# Patient Record
Sex: FEMALE | Race: BLACK OR AFRICAN AMERICAN | NOT HISPANIC OR LATINO | URBAN - METROPOLITAN AREA
[De-identification: names, ages, dates, MRNs, and addresses within clinical notes are randomized per-mention and may not be internally consistent; named-entity substitution may affect disease eponyms.]

---

## 2018-12-31 ENCOUNTER — EMERGENCY (EMERGENCY)
Facility: HOSPITAL | Age: 73
LOS: 1 days | Discharge: HOME | End: 2018-12-31
Attending: EMERGENCY MEDICINE | Admitting: EMERGENCY MEDICINE
Payer: MEDICARE

## 2018-12-31 VITALS
RESPIRATION RATE: 16 BRPM | SYSTOLIC BLOOD PRESSURE: 157 MMHG | OXYGEN SATURATION: 100 % | HEART RATE: 64 BPM | DIASTOLIC BLOOD PRESSURE: 93 MMHG | TEMPERATURE: 98 F

## 2018-12-31 VITALS
OXYGEN SATURATION: 100 % | TEMPERATURE: 98 F | SYSTOLIC BLOOD PRESSURE: 134 MMHG | RESPIRATION RATE: 18 BRPM | HEART RATE: 59 BPM | DIASTOLIC BLOOD PRESSURE: 70 MMHG

## 2018-12-31 PROCEDURE — 73502 X-RAY EXAM HIP UNI 2-3 VIEWS: CPT | Mod: 26,LT

## 2018-12-31 PROCEDURE — 99284 EMERGENCY DEPT VISIT MOD MDM: CPT | Mod: GC

## 2018-12-31 PROCEDURE — 73030 X-RAY EXAM OF SHOULDER: CPT | Mod: 26,LT

## 2018-12-31 PROCEDURE — 71045 X-RAY EXAM CHEST 1 VIEW: CPT | Mod: 26

## 2018-12-31 PROCEDURE — 73060 X-RAY EXAM OF HUMERUS: CPT | Mod: 26,LT

## 2018-12-31 PROCEDURE — 73552 X-RAY EXAM OF FEMUR 2/>: CPT | Mod: 26,LT

## 2018-12-31 RX ORDER — ACETAMINOPHEN 500 MG
975 TABLET ORAL ONCE
Qty: 0 | Refills: 0 | Status: COMPLETED | OUTPATIENT
Start: 2018-12-31 | End: 2018-12-31

## 2018-12-31 RX ORDER — LIDOCAINE 4 G/100G
1 CREAM TOPICAL ONCE
Qty: 0 | Refills: 0 | Status: COMPLETED | OUTPATIENT
Start: 2018-12-31 | End: 2018-12-31

## 2018-12-31 RX ADMIN — Medication 975 MILLIGRAM(S): at 12:56

## 2018-12-31 RX ADMIN — LIDOCAINE 1 PATCH: 4 CREAM TOPICAL at 15:24

## 2018-12-31 NOTE — ED PROVIDER NOTE - ATTENDING CONTRIBUTION TO CARE
Ena: 74 yo female s/p mechanical slip and fall yesterday on some water at home. Pt c/o left shoulder and left posterior thigh pain. Pt denies head trauma and LOC. No visual changes, dizziness, chest pain or SOB. No abdominal pain or hip pain. Pt has been ambulatory since the fall. No back or neck pain. Pt denies paresthesias and weakness. Exam: GENERAL: well appearing, NAD, HEENT: MMM, PERRLA, CARDIO: +S1/S2, no murmurs, rubs or gallops, LUNGS: CTA B/L, no wheezing, rales or rhonchi, ABD: soft, nontender, BSx4 quadrants, no guarding or rigidity. EXT: + anterior left shoulder TTP, appears squared off and pain elicited with abduction, no humeral TTP, NVI distally, no other bony TTP, no Rib TTP, + left posterior thigh soft tissue ecchymosis and TTP, no edema, no bony TTP, FROM and 5/5 strength.  No midline spinal TTP. NEURO: AxOx3,  SKIN: posterior thigh ecchymosis A/P- 74 yo female s/p mechanical fall with left shoulder and thigh pain. Thigh injury likely contusion but given age will obtain xrays, will also obtain shoulder/humerus xrays, give pain control and reassess. Ena: 74 yo female s/p mechanical slip and fall yesterday on some water at home. Pt c/o left shoulder and left posterior thigh pain. Pt denies head trauma and LOC. No visual changes, dizziness, chest pain or SOB. No abdominal pain or hip pain. Pt has been ambulatory since the fall. No back or neck pain. Pt denies paresthesias and weakness. Exam: GENERAL: well appearing, NAD, HEENT: MMM, PERRLA, CARDIO: +S1/S2, no murmurs, rubs or gallops, LUNGS: CTA B/L, no wheezing, rales or rhonchi, ABD: soft, nontender, BSx4 quadrants, no guarding or rigidity. EXT: + anterior left shoulder TTP, appears squared off and pain elicited with abduction, no humeral TTP, NVI distally, no other bony TTP, no Rib TTP, + left posterior thigh soft tissue ecchymosis and TTP, no edema, no bony TTP, FROM and 5/5 strength.  No midline spinal TTP. NO hip or pelvis TTP or instability. NEURO: AxOx3,  SKIN: posterior thigh ecchymosis A/P- 74 yo female s/p mechanical fall with left shoulder and thigh pain. Thigh injury likely contusion but given age will obtain xrays, will also obtain shoulder/humerus xrays, give pain control and reassess.

## 2018-12-31 NOTE — ED PROVIDER NOTE - CONSTITUTIONAL, MLM
normal... Well appearing, well nourished, awake, alert, oriented to person, place, time/situation and in no apparent distress. Holding left arm adducted

## 2018-12-31 NOTE — ED PROVIDER NOTE - NSFOLLOWUPINSTRUCTIONS_ED_ALL_ED_FT
Activities as tolerated. Please encourage good oral and fluid intake. For pain, please take or Tylenol 1000mg every 6 hours as needed. Please use lidopatch over the counter one patch every 24 hours as needed for your shoulder pain. Please use ice compresses to reduce swelling and inflammation.    Please see your primary care doctor within 24-48 hours for further management of your symptoms.  Please follow up with your orthopedic doctors for further evaluation of symptoms.    Please seek emergent medical management if you have any worsening signs or symptoms, such as chest pain, difficulty breathing, loss of consciousness, or persistent vomiting, new onset weakness, swelling, or new rash.

## 2018-12-31 NOTE — ED PROVIDER NOTE - PROGRESS NOTE DETAILS
Ena: Xrays unremarkable, discussed with radiology- agree with reads, pt is ambulatory will d.c home.

## 2018-12-31 NOTE — ED PROVIDER NOTE - MEDICAL DECISION MAKING DETAILS
73 year old female with unremarkable pmhx presenting with left shoulder pain s/p fall. Suggestive of dislocation vs fx vs musculoskeletal strain. Will get xrays to evaluate and give tylenol for pain control and reassess.

## 2018-12-31 NOTE — ED PROVIDER NOTE - CARE PLAN
Principal Discharge DX:	Contusion of left shoulder, initial encounter  Secondary Diagnosis:	Contusion of left thigh, initial encounter

## 2018-12-31 NOTE — ED ADULT TRIAGE NOTE - CHIEF COMPLAINT QUOTE
Pt had slip and fall in kitchen yesterday from wet floor. Pt c/o left shoulder and leg pain. Denies any head trauma or blood thinner use.

## 2018-12-31 NOTE — ED PROVIDER NOTE - MUSCULOSKELETAL, MLM
motor intact UE LE BL. Pain with ROM of L shoulder. Unable to abduct arm due to pain, but able to move passively. Tender at anterior shoulder, with possible bony deformity  on left anterior shoulder.. left posterior proximal LE tender to palpation. ROM intact in LE with mild pain with flexion of left hip

## 2018-12-31 NOTE — ED PROVIDER NOTE - OBJECTIVE STATEMENT
73 year old female with unremarkable pmhx presenting with left shoulder pain s/p fall. Patient was walking at home yesterday when she slipped and fell on some water on the floor. She stated her left leg extended forward and her left shoulder hit the cupboard 73 year old female with unremarkable pmhx presenting with left shoulder pain s/p fall. Patient was walking at home yesterday when she slipped and fell on some water on the floor. She stated her left leg extended forward and her left shoulder hit the cupboard. She is unsure if her left arm was stationed on floor when hitting shoulder. Since the fall, she endorses instant and constant 9/10 pain. Since yesterday, has been unable to move upper part of left arm. Also endorsing left  thigh pain on the back of her leg, worse with flexion, but still able to ambulate unassisted.    Denies LOC, head trauma, not on blood thinners, denies CP, SOB, N/V, vision changes.